# Patient Record
Sex: FEMALE | Race: WHITE | Employment: PART TIME | URBAN - METROPOLITAN AREA
[De-identification: names, ages, dates, MRNs, and addresses within clinical notes are randomized per-mention and may not be internally consistent; named-entity substitution may affect disease eponyms.]

---

## 2022-12-07 NOTE — PROGRESS NOTES
HPI: Sun Schulte (: 1970) is a 47 y.o. female, patient, here for evaluation of the following chief complaint(s):    Hand Pain (Pt says she has bilateral hand pain x 3 years. Pt states she had an EMG about 1 year ago.)  Patient is seen today to evaluate her hands. She has complained of numbness, ting and paresthesias in the median nerve distribution worsened over the last 3 years. She denies any fall or other injury. She is right-hand dominant and is more symptomatic on the right side. She has tried multiple treatment options including acupuncture and braces without long-term relief. She has significant nocturnal paresthesias on the right more than the left side. She underwent electrodiagnostic evaluation with [facility] on 2021 that showed mild to moderate right carpal tunnel syndrome and mild left carpal tunnel syndrome. Patient also has a component of right elbow ulnar neuritis with ulnar paresthesias and is seen for further treatment. Vitals:  Ht 5' 5\" (1.651 m)   Wt 157 lb (71.2 kg)   BMI 26.13 kg/m²    Body mass index is 26.13 kg/m². Not on File    Current Outpatient Medications   Medication Sig    ticagrelor (Brilinta) 90 mg tablet Take 90 mg by mouth two (2) times a day. metoprolol succinate (TOPROL-XL) 25 mg XL tablet Take 25 mg by mouth daily. aspirin delayed-release 81 mg tablet Take 81 mg by mouth daily. rosuvastatin (CRESTOR) 40 mg tablet Take 40 mg by mouth daily. No current facility-administered medications for this visit. History reviewed. No pertinent past medical history. History reviewed. No pertinent surgical history. History reviewed. No pertinent family history. Review of Systems   All other systems reviewed and are negative. Physical Exam    Patient is good elbow, forearm, wrist and digital range of motion.   She localizes discomfort with positive Tinel's to the right cubital tunnel but also at the right carpal tunnel more than left. She has intact thenar and intrinsic strength. No cyst or mass. No digital locking. Imaging:    XR Results (most recent):  No results found for this or any previous visit. ASSESSMENT/PLAN:  Below is the assessment and plan developed based on review of pertinent history, physical exam, labs, studies, and medications. Patient's examination was consistent with bilateral right greater than left wrist carpal tunnel syndrome and right elbow ulnar neuritis. She has been symptomatic for 3 years. EMG evaluation from 5/17/2021 did show mild to moderate right carpal tunnel and mild left carpal tunnel. She was offered but deferred injection therapy and prefers instead to proceed with operative treatment to include a right elbow ulnar nerve decompression and right endoscopic carpal tunnel release. I reviewed risks that include but not limited to stiffness, pain, nerve or tendon damage and overall incomplete relief of pain and paresthesias. Arrangements can be made for this to be performed on an outpatient basis soon as possible. 1. Bilateral carpal tunnel syndrome  2. Bilateral hand pain  3. Neuritis of right ulnar nerve      Return for Follow-up 7 to 10 days after surgery. .    An electronic signature was used to authenticate this note.   -- Odell Hernandez MD

## 2022-12-08 ENCOUNTER — OFFICE VISIT (OUTPATIENT)
Dept: ORTHOPEDIC SURGERY | Age: 52
End: 2022-12-08
Payer: COMMERCIAL

## 2022-12-08 VITALS — BODY MASS INDEX: 26.16 KG/M2 | HEIGHT: 65 IN | WEIGHT: 157 LBS

## 2022-12-08 DIAGNOSIS — G56.03 BILATERAL CARPAL TUNNEL SYNDROME: Primary | ICD-10-CM

## 2022-12-08 DIAGNOSIS — G56.21 NEURITIS OF RIGHT ULNAR NERVE: ICD-10-CM

## 2022-12-08 DIAGNOSIS — M79.641 BILATERAL HAND PAIN: ICD-10-CM

## 2022-12-08 DIAGNOSIS — M79.642 BILATERAL HAND PAIN: ICD-10-CM

## 2022-12-08 PROCEDURE — 99203 OFFICE O/P NEW LOW 30 MIN: CPT | Performed by: ORTHOPAEDIC SURGERY

## 2022-12-08 RX ORDER — ROSUVASTATIN CALCIUM 40 MG/1
40 TABLET, COATED ORAL DAILY
COMMUNITY

## 2022-12-08 RX ORDER — METOPROLOL SUCCINATE 25 MG/1
25 TABLET, EXTENDED RELEASE ORAL DAILY
COMMUNITY

## 2022-12-08 RX ORDER — ASPIRIN 81 MG/1
81 TABLET ORAL DAILY
COMMUNITY

## 2022-12-08 NOTE — PATIENT INSTRUCTIONS
Carpal Tunnel Syndrome: Care Instructions  Overview     Carpal tunnel syndrome is numbness, tingling, weakness, and pain in your hand, wrist, and sometimes forearm. It is caused by pressure on the median nerve. This nerve and several tough tissues called tendons run through a space in the wrist. This space is called the carpal tunnel. The repeated hand motions used in work and some hobbies and sports can put pressure on the median nerve. Pregnancy can cause carpal tunnel syndrome. Several conditions, such as diabetes, arthritis, and an underactive thyroid, can also cause it. You may be able to limit an activity or change the way you do it to reduce your symptoms. You also can take other steps to feel better. If your symptoms are mild, 1 to 2 weeks of home treatment are likely to ease your pain. Surgery is needed only if other treatments do not work. Follow-up care is a key part of your treatment and safety. Be sure to make and go to all appointments, and call your doctor if you are having problems. It's also a good idea to know your test results and keep a list of the medicines you take. How can you care for yourself at home? If possible, stop or reduce the activity that causes your symptoms. If you cannot stop the activity, take frequent breaks to rest and stretch or change hand positions to do a task. Try switching hands, such as when using a computer mouse. Try to avoid bending or twisting your wrists. Ask your doctor if you can take an over-the-counter pain medicine, such as acetaminophen (Tylenol), ibuprofen (Advil, Motrin), or naproxen (Aleve). Be safe with medicines. Read and follow all instructions on the label. If your doctor prescribes corticosteroid medicine to help reduce pain and swelling, take it exactly as prescribed. Call your doctor if you think you are having a problem with your medicine. Put ice or a cold pack on your wrist for 10 to 20 minutes at a time to ease pain.  Put a thin cloth between the ice and your skin. If your doctor or your physical or occupational therapist tells you to wear a wrist splint, wear it as directed to keep your wrist in a neutral position. This also eases pressure on your median nerve. Ask your doctor whether you should have physical or occupational therapy to learn how to do tasks differently. Try a yoga class to stretch your muscles and build strength in your hands and wrists. Yoga has been shown to ease carpal tunnel symptoms. To prevent carpal tunnel  When working at a computer, keep your hands and wrists in line with your forearms. Hold your elbows close to your sides. Take a break every 10 to 15 minutes. Try these exercises:  Warm up: Rotate your wrist up, down, and from side to side. Repeat this 4 times. Stretch your fingers far apart, relax them, then stretch them again. Repeat 4 times. Stretch your thumb by pulling it back gently, holding it, and then releasing it. Repeat 4 times. Prayer stretch: Start with your palms together in front of your chest just below your chin. Slowly lower your hands toward your waistline while keeping your hands close to your stomach and your palms together until you feel a mild to moderate stretch under your forearms. Hold for 10 to 20 seconds. Repeat 4 times. Wrist flexor stretch: Hold your arm in front of you with your palm up. Bend your wrist, pointing your hand toward the floor. With your other hand, gently bend your wrist further until you feel a mild to moderate stretch in your forearm. Hold for 10 to 20 seconds. Repeat 4 times. Wrist extensor stretch: Repeat the steps for the wrist flexor stretch, but begin with your extended hand palm down. Squeeze a rubber exercise ball several times a day to keep your hands and fingers strong. Avoid holding objects (such as a book) in one position for a long time. When possible, use your whole hand to grasp an object.  Using just the thumb and index finger can put stress on the wrist.  Do not smoke. It can make this condition worse by reducing blood flow to the median nerve. If you need help quitting, talk to your doctor about stop-smoking programs and medicines. These can increase your chances of quitting for good. When should you call for help? Watch closely for changes in your health, and be sure to contact your doctor if:    Your pain or other problems do not get better with home care. You want more information about physical or occupational therapy. You have side effects of your corticosteroid medicine, such as:  Weight gain. Mood changes. Trouble sleeping. Bruising easily. You have any other problems with your medicine. Where can you learn more? Go to http://www.gray.com/  Enter R432 in the search box to learn more about \"Carpal Tunnel Syndrome: Care Instructions. \"  Current as of: March 9, 2022               Content Version: 13.4  © 7609-4549 Healthwise, Incorporated. Care instructions adapted under license by [employer] (which disclaims liability or warranty for this information). If you have questions about a medical condition or this instruction, always ask your healthcare professional. [address] any warranty or liability for your use of this information.

## 2023-01-16 ENCOUNTER — TELEPHONE (OUTPATIENT)
Dept: ORTHOPEDIC SURGERY | Age: 53
End: 2023-01-16

## 2023-01-16 DIAGNOSIS — G56.03 BILATERAL CARPAL TUNNEL SYNDROME: Primary | ICD-10-CM

## 2023-01-16 DIAGNOSIS — G56.21 NEURITIS OF RIGHT ULNAR NERVE: ICD-10-CM

## 2023-01-16 RX ORDER — TRAMADOL HYDROCHLORIDE 50 MG/1
50 TABLET ORAL
Qty: 15 TABLET | Refills: 0 | Status: SHIPPED | OUTPATIENT
Start: 2023-01-16 | End: 2023-01-23

## 2023-01-18 DIAGNOSIS — G56.03 BILATERAL CARPAL TUNNEL SYNDROME: Primary | ICD-10-CM

## 2023-01-18 RX ORDER — HYDROCODONE BITARTRATE AND ACETAMINOPHEN 5; 325 MG/1; MG/1
1 TABLET ORAL
Qty: 15 TABLET | Refills: 0 | Status: SHIPPED | OUTPATIENT
Start: 2023-01-18 | End: 2023-01-25

## 2023-01-18 RX ORDER — PROMETHAZINE HYDROCHLORIDE 25 MG/1
12.5 TABLET ORAL
Qty: 15 TABLET | Refills: 0 | Status: SHIPPED | OUTPATIENT
Start: 2023-01-18

## 2023-01-24 ENCOUNTER — OFFICE VISIT (OUTPATIENT)
Dept: ORTHOPEDIC SURGERY | Age: 53
End: 2023-01-24
Payer: COMMERCIAL

## 2023-01-24 VITALS — BODY MASS INDEX: 26.16 KG/M2 | HEIGHT: 65 IN | WEIGHT: 157 LBS

## 2023-01-24 DIAGNOSIS — Z98.890 STATUS POST CARPAL TUNNEL RELEASE: ICD-10-CM

## 2023-01-24 DIAGNOSIS — M79.642 BILATERAL HAND PAIN: ICD-10-CM

## 2023-01-24 DIAGNOSIS — G56.03 BILATERAL CARPAL TUNNEL SYNDROME: ICD-10-CM

## 2023-01-24 DIAGNOSIS — Z98.890 STATUS POST DECOMPRESSION OF ULNAR NERVE: ICD-10-CM

## 2023-01-24 DIAGNOSIS — M79.641 BILATERAL HAND PAIN: ICD-10-CM

## 2023-01-24 DIAGNOSIS — G56.21 NEURITIS OF RIGHT ULNAR NERVE: Primary | ICD-10-CM

## 2023-01-24 PROCEDURE — 99024 POSTOP FOLLOW-UP VISIT: CPT | Performed by: ORTHOPAEDIC SURGERY

## 2023-01-24 NOTE — LETTER
1/24/2023    Patient: Mariluz Frazier   YOB: 1970   Date of Visit: 1/24/2023     Velma Bee MD  [address] [place] [id number]  Via Fax: [phone]    Dear Velma Bee MD,      Thank you for referring Ms. Mariluz Frazier to [facility] for evaluation. My notes for this consultation are attached. If you have questions, please do not hesitate to call me. I look forward to following your patient along with you.       Sincerely,    Liliya Valdez MD

## 2023-01-24 NOTE — PATIENT INSTRUCTIONS
Wrist: Exercises  Introduction  Here are some examples of exercises for you to try. The exercises may be suggested for a condition or for rehabilitation. Start each exercise slowly. Ease off the exercises if you start to have pain. You will be told when to start these exercises and which ones will work best for you. How to do the exercises  Prayer stretch  Start with your palms together in front of your chest just below your chin. Slowly lower your hands toward your waistline, keeping your hands close to your stomach and your palms together until you feel a mild to moderate stretch under your forearms. Hold for at least 15 to 30 seconds. Repeat 2 to 4 times. Wrist flexor stretch  Extend your arm in front of you with your palm up. Bend your wrist, pointing your hand toward the floor. With your other hand, gently bend your wrist farther until you feel a mild to moderate stretch in your forearm. Hold for at least 15 to 30 seconds. Repeat 2 to 4 times. Wrist extensor stretch  Repeat steps 1 through 4 of the stretch above, but begin with your extended hand palm down. Follow-up care is a key part of your treatment and safety. Be sure to make and go to all appointments, and call your doctor if you are having problems. It's also a good idea to know your test results and keep a list of the medicines you take. Current as of: March 9, 2022               Content Version: 13.4  © 2006-2022 Healthwise, Incorporated. Care instructions adapted under license by [employer] (which disclaims liability or warranty for this information). If you have questions about a medical condition or this instruction, always ask your healthcare professional. [place] [address] any warranty or liability for your use of this information.

## 2023-01-24 NOTE — PROGRESS NOTES
HPI: Claudine Monae (: 1970) is a 47 y.o. female, patient, here for evaluation of the following chief complaint(s):    No chief complaint on file. Patient is seen today to evaluate her hands. She has complained of numbness, ting and paresthesias in the median nerve distribution worsened over the last 3 years. She denies any fall or other injury. She is right-hand dominant and is more symptomatic on the right side. She has tried multiple treatment options including acupuncture and braces without long-term relief. She has significant nocturnal paresthesias on the right more than the left side. She underwent electrodiagnostic evaluation with [facility] on 2021 that showed mild to moderate right carpal tunnel syndrome and mild left carpal tunnel syndrome. Patient also has a component of right elbow ulnar neuritis with ulnar paresthesias. She underwent a right endoscopic carpal tunnel and right elbow ulnar nerve decompression release on 2023. Vitals: There were no vitals taken for this visit. There is no height or weight on file to calculate BMI. Allergies   Allergen Reactions    Codeine Nausea and Vomiting       Current Outpatient Medications   Medication Sig    HYDROcodone-acetaminophen (Norco) 5-325 mg per tablet Take 1 Tablet by mouth every four (4) hours as needed for Pain (Post op supervising physician Darcie Magallanes MD ODX#[id number]) for up to 7 days. Max Daily Amount: 6 Tablets. promethazine (PHENERGAN) 25 mg tablet Take 0.5 Tablets by mouth every six (6) hours as needed for Nausea. ticagrelor (Brilinta) 90 mg tablet Take 90 mg by mouth two (2) times a day. metoprolol succinate (TOPROL-XL) 25 mg XL tablet Take 25 mg by mouth daily. aspirin delayed-release 81 mg tablet Take 81 mg by mouth daily. rosuvastatin (CRESTOR) 40 mg tablet Take 40 mg by mouth daily. No current facility-administered medications for this visit. No past medical history on file. No past surgical history on file. No family history on file. Review of Systems   All other systems reviewed and are negative. Physical Exam    Overall the right elbow and wrist wounds healing well there is no redness drainage or sign infection. Sensation is improved and is good intrinsic strength as well. Imaging:    XR Results (most recent):  No results found for this or any previous visit. ASSESSMENT/PLAN:  Below is the assessment and plan developed based on review of pertinent history, physical exam, labs, studies, and medications. Patient's examination was consistent with bilateral right greater than left wrist carpal tunnel syndrome and right elbow ulnar neuritis. She has been symptomatic for 3 years. EMG evaluation from 5/17/2021 did show mild to moderate right carpal tunnel and mild left carpal tunnel. She was offered but deferred injection therapy and prefers instead to proceed with operative treatment to include a right elbow ulnar nerve decompression and right endoscopic carpal tunnel release. She underwent a right endoscopic carpal tunnel release and a right elbow ulnar nerve decompression on 1/17/2023. Continue with wound care, gentle motion and strength. She is pleased with the resolution of numbness and pain in the right wrist and hand. She is now scheduling for a contralateral left endoscopic carpal tunnel release and ulnar nerve decompression. Once again I reviewed risks and benefits that include but not limited to stiffness, pain, nerve or tendon damage and incomplete relief of pain and paresthesias. Arrangements can be made for this to be performed on an outpatient basis at her convenience. 1. Neuritis of right ulnar nerve  2. Bilateral carpal tunnel syndrome  3. Bilateral hand pain  4. Status post carpal tunnel release  5. Status post decompression of ulnar nerve      No follow-ups on file.     An electronic signature was used to authenticate this note.   -- Qamar Simms MD

## 2023-01-30 DIAGNOSIS — Z98.890 STATUS POST DECOMPRESSION OF ULNAR NERVE: ICD-10-CM

## 2023-01-30 DIAGNOSIS — G56.03 BILATERAL CARPAL TUNNEL SYNDROME: Primary | ICD-10-CM

## 2023-02-17 ENCOUNTER — TELEPHONE (OUTPATIENT)
Dept: ORTHOPEDIC SURGERY | Age: 53
End: 2023-02-17

## 2023-02-17 DIAGNOSIS — G56.22 NEURITIS OF LEFT ULNAR NERVE: ICD-10-CM

## 2023-02-17 DIAGNOSIS — G56.03 BILATERAL CARPAL TUNNEL SYNDROME: Primary | ICD-10-CM

## 2023-02-17 RX ORDER — PROMETHAZINE HYDROCHLORIDE 25 MG/1
25 TABLET ORAL
Qty: 15 TABLET | Refills: 0 | Status: SHIPPED | OUTPATIENT
Start: 2023-02-17

## 2023-02-17 RX ORDER — HYDROCODONE BITARTRATE AND ACETAMINOPHEN 5; 325 MG/1; MG/1
1 TABLET ORAL
Qty: 15 TABLET | Refills: 0 | Status: SHIPPED | OUTPATIENT
Start: 2023-02-17 | End: 2023-02-24

## 2023-03-01 NOTE — PROGRESS NOTES
HPI: Duane Leung (: 1970) is a 47 y.o. female, patient, here for evaluation of the following chief complaint(s):    Surgical Follow-up (Follow up on left ECTR and elbow unlar nerve decompression done 23.)  Patient is seen today to evaluate her hands. She has complained of numbness, ting and paresthesias in the median nerve distribution worsened over the last 3 years. She denies any fall or other injury. She is right-hand dominant and is more symptomatic on the right side. She has tried multiple treatment options including acupuncture and braces without long-term relief. She has significant nocturnal paresthesias on the right more than the left side. She underwent electrodiagnostic evaluation with [facility] on 2021 that showed mild to moderate right carpal tunnel syndrome and mild left carpal tunnel syndrome. Patient also has a component of right elbow ulnar neuritis with ulnar paresthesias. She underwent a right endoscopic carpal tunnel and right elbow ulnar nerve decompression release on 2023. She recovered well and next underwent a left endoscopic carpal tunnel release and left elbow ulnar nerve decompression on 2023. Vitals:  Ht 5' 5\" (1.651 m)   Wt 157 lb (71.2 kg)   BMI 26.13 kg/m²    Body mass index is 26.13 kg/m². Allergies   Allergen Reactions    Codeine Nausea and Vomiting       Current Outpatient Medications   Medication Sig    promethazine (PHENERGAN) 25 mg tablet Take 1 Tablet by mouth every six (6) hours as needed for Nausea. promethazine (PHENERGAN) 25 mg tablet Take 0.5 Tablets by mouth every six (6) hours as needed for Nausea. ticagrelor (Brilinta) 90 mg tablet Take 90 mg by mouth two (2) times a day. metoprolol succinate (TOPROL-XL) 25 mg XL tablet Take 25 mg by mouth daily. aspirin delayed-release 81 mg tablet Take 81 mg by mouth daily. rosuvastatin (CRESTOR) 40 mg tablet Take 40 mg by mouth daily.      No current facility-administered medications for this visit. History reviewed. No pertinent past medical history. History reviewed. No pertinent surgical history. History reviewed. No pertinent family history. Review of Systems   All other systems reviewed and are negative. Physical Exam    Overall the right elbow and wrist wounds are well-healed and there is no redness drainage or sign infection. Sensation is improved and also has good intrinsic strength. Left elbow and wrist wounds also healing well without sign of infection and sensation seems improved. Imaging:    XR Results (most recent):  No results found for this or any previous visit. ASSESSMENT/PLAN:  Below is the assessment and plan developed based on review of pertinent history, physical exam, labs, studies, and medications. Patient's examination was consistent with bilateral right greater than left wrist carpal tunnel syndrome and right elbow ulnar neuritis. She has been symptomatic for 3 years. EMG evaluation from 5/17/2021 did show mild to moderate right carpal tunnel and mild left carpal tunnel. She was offered but deferred injection therapy and prefers instead to proceed with operative treatment to include a right elbow ulnar nerve decompression and right endoscopic carpal tunnel release. She underwent a right endoscopic carpal tunnel release and a right elbow ulnar nerve decompression on 1/17/2023. Continue with wound care, gentle motion and strength. She is pleased with the resolution of numbness and pain in the right wrist and hand. She next underwent a left endoscopic carpal tunnel release with ulnar nerve decompression at the elbow on 2/20/2023. Once again she may continue with wound care with gentle motion and strength and return in 3 to 4 weeks. Overall she is doing well and the pre-op numbness / tingling has resolved. 1. Bilateral carpal tunnel syndrome  2. Neuritis of right ulnar nerve  3. Status post carpal tunnel release  4. Status post decompression of ulnar nerve  5. Bilateral hand pain  6. Neuritis of left ulnar nerve      Return in about 4 weeks (around 3/30/2023). An electronic signature was used to authenticate this note.   -- Mirian Harry MD

## 2023-03-02 ENCOUNTER — OFFICE VISIT (OUTPATIENT)
Dept: ORTHOPEDIC SURGERY | Age: 53
End: 2023-03-02
Payer: COMMERCIAL

## 2023-03-02 VITALS — BODY MASS INDEX: 26.16 KG/M2 | WEIGHT: 157 LBS | HEIGHT: 65 IN

## 2023-03-02 DIAGNOSIS — G56.22 NEURITIS OF LEFT ULNAR NERVE: ICD-10-CM

## 2023-03-02 DIAGNOSIS — G56.03 BILATERAL CARPAL TUNNEL SYNDROME: Primary | ICD-10-CM

## 2023-03-02 DIAGNOSIS — M79.641 BILATERAL HAND PAIN: ICD-10-CM

## 2023-03-02 DIAGNOSIS — Z98.890 STATUS POST DECOMPRESSION OF ULNAR NERVE: ICD-10-CM

## 2023-03-02 DIAGNOSIS — G56.21 NEURITIS OF RIGHT ULNAR NERVE: ICD-10-CM

## 2023-03-02 DIAGNOSIS — M79.642 BILATERAL HAND PAIN: ICD-10-CM

## 2023-03-02 DIAGNOSIS — Z98.890 STATUS POST CARPAL TUNNEL RELEASE: ICD-10-CM

## 2023-03-02 PROCEDURE — 99024 POSTOP FOLLOW-UP VISIT: CPT | Performed by: ORTHOPAEDIC SURGERY

## 2023-03-02 NOTE — PATIENT INSTRUCTIONS
Wrist: Exercises  Introduction  Here are some examples of exercises for you to try. The exercises may be suggested for a condition or for rehabilitation. Start each exercise slowly. Ease off the exercises if you start to have pain. You will be told when to start these exercises and which ones will work best for you. How to do the exercises  Prayer stretch  Start with your palms together in front of your chest just below your chin. Slowly lower your hands toward your waistline, keeping your hands close to your stomach and your palms together until you feel a mild to moderate stretch under your forearms. Hold for at least 15 to 30 seconds. Repeat 2 to 4 times. Wrist flexor stretch  Extend your arm in front of you with your palm up. Bend your wrist, pointing your hand toward the floor. With your other hand, gently bend your wrist farther until you feel a mild to moderate stretch in your forearm. Hold for at least 15 to 30 seconds. Repeat 2 to 4 times. Wrist extensor stretch  Repeat steps 1 through 4 of the stretch above, but begin with your extended hand palm down. Follow-up care is a key part of your treatment and safety. Be sure to make and go to all appointments, and call your doctor if you are having problems. It's also a good idea to know your test results and keep a list of the medicines you take. Current as of: March 9, 2022               Content Version: 13.4  © 2006-2022 Healthwise, Incorporated. Care instructions adapted under license by [employer] (which disclaims liability or warranty for this information). If you have questions about a medical condition or this instruction, always ask your healthcare professional. [place] [address] any warranty or liability for your use of this information. Elbow: Exercises  Introduction  Here are some examples of exercises for you to try. The exercises may be suggested for a condition or for rehabilitation.  Start each exercise slowly. Ease off the exercises if you start to have pain. You will be told when to start these exercises and which ones will work best for you. How to do the exercises  Wrist flexor stretch  Extend your arm in front of you with your palm up. Bend your wrist, pointing your hand toward the floor. With your other hand, gently bend your wrist farther until you feel a mild to moderate stretch in your forearm. Hold for at least 15 to 30 seconds. Repeat 2 to 4 times. Wrist extensor stretch  Repeat steps 1 to 4 of the stretch above but begin with your extended hand palm down. Ball or sock squeeze  Hold a tennis ball (or a rolled-up sock) in your hand. Make a fist around the ball (or sock) and squeeze. Hold for about 6 seconds, and then relax for up to 10 seconds. Repeat 8 to 12 times. Switch the ball (or sock) to your other hand and do 8 to 12 times. Wrist deviation  Sit so that your arm is supported but your hand hangs off the edge of a flat surface, such as a table. Hold your hand out like you are shaking hands with someone. Move your hand up and down. Repeat this motion 8 to 12 times. Switch arms. Try to do this exercise twice with each hand. Wrist curls  Place your forearm on a table with your hand hanging over the edge of the table, palm up. Place a 1- to 2-pound weight in your hand. This may be a dumbbell, a can of food, or a filled water bottle. Slowly raise and lower the weight while keeping your forearm on the table and your palm facing up. Repeat this motion 8 to 12 times. Switch arms, and do steps 1 through 4. Repeat with your hand facing down toward the floor. Switch arms. Biceps curls  Sit leaning forward with your legs slightly spread and your left hand on your left thigh. Place your right elbow on your right thigh, and hold the weight with your forearm horizontal.  Slowly curl the weight up and toward your chest.  Repeat this motion 8 to 12 times.   Switch arms, and do steps 1 through 4. Follow-up care is a key part of your treatment and safety. Be sure to make and go to all appointments, and call your doctor if you are having problems. It's also a good idea to know your test results and keep a list of the medicines you take. Current as of: March 9, 2022               Content Version: 13.4  © 2006-2022 Healthwise, [employer]. Care instructions adapted under license by [employer] (which disclaims liability or warranty for this information). If you have questions about a medical condition or this instruction, always ask your healthcare professional. [address] any warranty or liability for your use of this information.

## 2023-03-02 NOTE — LETTER
3/2/2023    Patient: Mari Dawson   YOB: 1970   Date of Visit: 3/2/2023     Zena Lloyd MD  [id number]  [place] [address]  Via Fax: [phone]    Dear Zena Lloyd MD,      Thank you for referring Ms. Mari Dawson to [facility] for evaluation. My notes for this consultation are attached. If you have questions, please do not hesitate to call me. I look forward to following your patient along with you.       Sincerely,    Deep Oconnell MD

## 2023-05-23 RX ORDER — PROMETHAZINE HYDROCHLORIDE 25 MG/1
25 TABLET ORAL EVERY 6 HOURS PRN
COMMUNITY
Start: 2023-01-18

## 2023-05-23 RX ORDER — METOPROLOL SUCCINATE 25 MG/1
25 TABLET, EXTENDED RELEASE ORAL DAILY
COMMUNITY

## 2023-05-23 RX ORDER — ROSUVASTATIN CALCIUM 40 MG/1
40 TABLET, COATED ORAL DAILY
COMMUNITY

## 2023-05-23 RX ORDER — ASPIRIN 81 MG/1
81 TABLET ORAL DAILY
COMMUNITY